# Patient Record
Sex: MALE | Race: OTHER | NOT HISPANIC OR LATINO | URBAN - METROPOLITAN AREA
[De-identification: names, ages, dates, MRNs, and addresses within clinical notes are randomized per-mention and may not be internally consistent; named-entity substitution may affect disease eponyms.]

---

## 2017-10-01 ENCOUNTER — OUTPATIENT (OUTPATIENT)
Dept: OUTPATIENT SERVICES | Facility: HOSPITAL | Age: 32
LOS: 1 days | End: 2017-10-01
Payer: MEDICAID

## 2017-10-01 PROCEDURE — G9001: CPT

## 2017-10-12 DIAGNOSIS — R69 ILLNESS, UNSPECIFIED: ICD-10-CM

## 2022-02-26 ENCOUNTER — INPATIENT (INPATIENT)
Facility: HOSPITAL | Age: 37
LOS: 1 days | Discharge: HOME | DRG: 897 | End: 2022-02-28
Attending: INTERNAL MEDICINE | Admitting: INTERNAL MEDICINE
Payer: MEDICAID

## 2022-02-26 VITALS
OXYGEN SATURATION: 98 % | RESPIRATION RATE: 17 BRPM | HEART RATE: 76 BPM | HEIGHT: 72 IN | DIASTOLIC BLOOD PRESSURE: 80 MMHG | WEIGHT: 266.98 LBS | SYSTOLIC BLOOD PRESSURE: 137 MMHG | TEMPERATURE: 98 F

## 2022-02-26 DIAGNOSIS — F11.23 OPIOID DEPENDENCE WITH WITHDRAWAL: ICD-10-CM

## 2022-02-26 DIAGNOSIS — J45.909 UNSPECIFIED ASTHMA, UNCOMPLICATED: ICD-10-CM

## 2022-02-26 DIAGNOSIS — Z29.9 ENCOUNTER FOR PROPHYLACTIC MEASURES, UNSPECIFIED: ICD-10-CM

## 2022-02-26 LAB
ALBUMIN SERPL ELPH-MCNC: 3.7 G/DL — SIGNIFICANT CHANGE UP (ref 3.5–5)
ALP SERPL-CCNC: 119 U/L — SIGNIFICANT CHANGE UP (ref 40–120)
ALT FLD-CCNC: 35 U/L DA — SIGNIFICANT CHANGE UP (ref 10–60)
AMPHET UR-MCNC: NEGATIVE — SIGNIFICANT CHANGE UP
ANION GAP SERPL CALC-SCNC: 7 MMOL/L — SIGNIFICANT CHANGE UP (ref 5–17)
APPEARANCE UR: CLEAR — SIGNIFICANT CHANGE UP
AST SERPL-CCNC: 19 U/L — SIGNIFICANT CHANGE UP (ref 10–40)
BACTERIA # UR AUTO: ABNORMAL /HPF
BARBITURATES UR SCN-MCNC: NEGATIVE — SIGNIFICANT CHANGE UP
BASOPHILS # BLD AUTO: 0.03 K/UL — SIGNIFICANT CHANGE UP (ref 0–0.2)
BASOPHILS NFR BLD AUTO: 0.3 % — SIGNIFICANT CHANGE UP (ref 0–2)
BENZODIAZ UR-MCNC: NEGATIVE — SIGNIFICANT CHANGE UP
BILIRUB SERPL-MCNC: 0.8 MG/DL — SIGNIFICANT CHANGE UP (ref 0.2–1.2)
BILIRUB UR-MCNC: NEGATIVE — SIGNIFICANT CHANGE UP
BUN SERPL-MCNC: 10 MG/DL — SIGNIFICANT CHANGE UP (ref 7–18)
CALCIUM SERPL-MCNC: 8.9 MG/DL — SIGNIFICANT CHANGE UP (ref 8.4–10.5)
CHLORIDE SERPL-SCNC: 109 MMOL/L — HIGH (ref 96–108)
CO2 SERPL-SCNC: 22 MMOL/L — SIGNIFICANT CHANGE UP (ref 22–31)
COCAINE METAB.OTHER UR-MCNC: NEGATIVE — SIGNIFICANT CHANGE UP
COLOR SPEC: YELLOW — SIGNIFICANT CHANGE UP
CREAT SERPL-MCNC: 0.9 MG/DL — SIGNIFICANT CHANGE UP (ref 0.5–1.3)
DIFF PNL FLD: ABNORMAL
EOSINOPHIL # BLD AUTO: 0.03 K/UL — SIGNIFICANT CHANGE UP (ref 0–0.5)
EOSINOPHIL NFR BLD AUTO: 0.3 % — SIGNIFICANT CHANGE UP (ref 0–6)
EPI CELLS # UR: ABNORMAL /HPF
GLUCOSE SERPL-MCNC: 108 MG/DL — HIGH (ref 70–99)
GLUCOSE UR QL: NEGATIVE — SIGNIFICANT CHANGE UP
HCT VFR BLD CALC: 39.4 % — SIGNIFICANT CHANGE UP (ref 39–50)
HGB BLD-MCNC: 13.2 G/DL — SIGNIFICANT CHANGE UP (ref 13–17)
IMM GRANULOCYTES NFR BLD AUTO: 0.4 % — SIGNIFICANT CHANGE UP (ref 0–1.5)
KETONES UR-MCNC: NEGATIVE — SIGNIFICANT CHANGE UP
LEUKOCYTE ESTERASE UR-ACNC: NEGATIVE — SIGNIFICANT CHANGE UP
LYMPHOCYTES # BLD AUTO: 1.76 K/UL — SIGNIFICANT CHANGE UP (ref 1–3.3)
LYMPHOCYTES # BLD AUTO: 15.8 % — SIGNIFICANT CHANGE UP (ref 13–44)
MCHC RBC-ENTMCNC: 28.2 PG — SIGNIFICANT CHANGE UP (ref 27–34)
MCHC RBC-ENTMCNC: 33.5 GM/DL — SIGNIFICANT CHANGE UP (ref 32–36)
MCV RBC AUTO: 84.2 FL — SIGNIFICANT CHANGE UP (ref 80–100)
METHADONE UR-MCNC: POSITIVE
MONOCYTES # BLD AUTO: 0.63 K/UL — SIGNIFICANT CHANGE UP (ref 0–0.9)
MONOCYTES NFR BLD AUTO: 5.6 % — SIGNIFICANT CHANGE UP (ref 2–14)
NEUTROPHILS # BLD AUTO: 8.66 K/UL — HIGH (ref 1.8–7.4)
NEUTROPHILS NFR BLD AUTO: 77.6 % — HIGH (ref 43–77)
NITRITE UR-MCNC: NEGATIVE — SIGNIFICANT CHANGE UP
NRBC # BLD: 0 /100 WBCS — SIGNIFICANT CHANGE UP (ref 0–0)
OPIATES UR-MCNC: NEGATIVE — SIGNIFICANT CHANGE UP
PCP SPEC-MCNC: SIGNIFICANT CHANGE UP
PCP UR-MCNC: NEGATIVE — SIGNIFICANT CHANGE UP
PH UR: 6 — SIGNIFICANT CHANGE UP (ref 5–8)
PLATELET # BLD AUTO: 257 K/UL — SIGNIFICANT CHANGE UP (ref 150–400)
POTASSIUM SERPL-MCNC: 3.6 MMOL/L — SIGNIFICANT CHANGE UP (ref 3.5–5.3)
POTASSIUM SERPL-SCNC: 3.6 MMOL/L — SIGNIFICANT CHANGE UP (ref 3.5–5.3)
PROT SERPL-MCNC: 7.9 G/DL — SIGNIFICANT CHANGE UP (ref 6–8.3)
PROT UR-MCNC: NEGATIVE — SIGNIFICANT CHANGE UP
RBC # BLD: 4.68 M/UL — SIGNIFICANT CHANGE UP (ref 4.2–5.8)
RBC # FLD: 12.2 % — SIGNIFICANT CHANGE UP (ref 10.3–14.5)
RBC CASTS # UR COMP ASSIST: SIGNIFICANT CHANGE UP /HPF (ref 0–2)
SARS-COV-2 RNA SPEC QL NAA+PROBE: SIGNIFICANT CHANGE UP
SODIUM SERPL-SCNC: 138 MMOL/L — SIGNIFICANT CHANGE UP (ref 135–145)
SP GR SPEC: 1.02 — SIGNIFICANT CHANGE UP (ref 1.01–1.02)
THC UR QL: NEGATIVE — SIGNIFICANT CHANGE UP
UROBILINOGEN FLD QL: NEGATIVE — SIGNIFICANT CHANGE UP
WBC # BLD: 11.16 K/UL — HIGH (ref 3.8–10.5)
WBC # FLD AUTO: 11.16 K/UL — HIGH (ref 3.8–10.5)
WBC UR QL: SIGNIFICANT CHANGE UP /HPF (ref 0–5)

## 2022-02-26 PROCEDURE — 99285 EMERGENCY DEPT VISIT HI MDM: CPT

## 2022-02-26 RX ORDER — PANTOPRAZOLE SODIUM 20 MG/1
40 TABLET, DELAYED RELEASE ORAL
Refills: 0 | Status: DISCONTINUED | OUTPATIENT
Start: 2022-02-26 | End: 2022-02-28

## 2022-02-26 RX ORDER — SODIUM CHLORIDE 9 MG/ML
1000 INJECTION INTRAMUSCULAR; INTRAVENOUS; SUBCUTANEOUS
Refills: 0 | Status: DISCONTINUED | OUTPATIENT
Start: 2022-02-26 | End: 2022-02-28

## 2022-02-26 RX ORDER — METHADONE HYDROCHLORIDE 40 MG/1
10 TABLET ORAL ONCE
Refills: 0 | Status: DISCONTINUED | OUTPATIENT
Start: 2022-02-26 | End: 2022-02-26

## 2022-02-26 RX ORDER — NICOTINE POLACRILEX 2 MG
1 GUM BUCCAL DAILY
Refills: 0 | Status: DISCONTINUED | OUTPATIENT
Start: 2022-02-26 | End: 2022-02-27

## 2022-02-26 RX ORDER — SODIUM CHLORIDE 9 MG/ML
1000 INJECTION INTRAMUSCULAR; INTRAVENOUS; SUBCUTANEOUS ONCE
Refills: 0 | Status: COMPLETED | OUTPATIENT
Start: 2022-02-26 | End: 2022-02-26

## 2022-02-26 RX ORDER — KETOROLAC TROMETHAMINE 30 MG/ML
15 SYRINGE (ML) INJECTION ONCE
Refills: 0 | Status: DISCONTINUED | OUTPATIENT
Start: 2022-02-26 | End: 2022-02-26

## 2022-02-26 RX ORDER — OCTREOTIDE ACETATE 200 UG/ML
50 INJECTION, SOLUTION INTRAVENOUS; SUBCUTANEOUS ONCE
Refills: 0 | Status: COMPLETED | OUTPATIENT
Start: 2022-02-26 | End: 2022-02-26

## 2022-02-26 RX ORDER — METHADONE HYDROCHLORIDE 40 MG/1
20 TABLET ORAL ONCE
Refills: 0 | Status: DISCONTINUED | OUTPATIENT
Start: 2022-02-26 | End: 2022-02-26

## 2022-02-26 RX ORDER — METOCLOPRAMIDE HCL 10 MG
10 TABLET ORAL EVERY 8 HOURS
Refills: 0 | Status: DISCONTINUED | OUTPATIENT
Start: 2022-02-26 | End: 2022-02-26

## 2022-02-26 RX ORDER — LOPERAMIDE HCL 2 MG
2 TABLET ORAL THREE TIMES A DAY
Refills: 0 | Status: DISCONTINUED | OUTPATIENT
Start: 2022-02-26 | End: 2022-02-26

## 2022-02-26 RX ADMIN — SODIUM CHLORIDE 150 MILLILITER(S): 9 INJECTION INTRAMUSCULAR; INTRAVENOUS; SUBCUTANEOUS at 20:55

## 2022-02-26 RX ADMIN — Medication 1 PATCH: at 21:12

## 2022-02-26 RX ADMIN — METHADONE HYDROCHLORIDE 10 MILLIGRAM(S): 40 TABLET ORAL at 20:55

## 2022-02-26 RX ADMIN — Medication 2 MILLIGRAM(S): at 14:08

## 2022-02-26 RX ADMIN — SODIUM CHLORIDE 1000 MILLILITER(S): 9 INJECTION INTRAMUSCULAR; INTRAVENOUS; SUBCUTANEOUS at 16:53

## 2022-02-26 RX ADMIN — SODIUM CHLORIDE 2000 MILLILITER(S): 9 INJECTION INTRAMUSCULAR; INTRAVENOUS; SUBCUTANEOUS at 14:08

## 2022-02-26 RX ADMIN — Medication 15 MILLIGRAM(S): at 14:08

## 2022-02-26 RX ADMIN — OCTREOTIDE ACETATE 50 MICROGRAM(S): 200 INJECTION, SOLUTION INTRAVENOUS; SUBCUTANEOUS at 22:12

## 2022-02-26 RX ADMIN — SODIUM CHLORIDE 1000 MILLILITER(S): 9 INJECTION INTRAMUSCULAR; INTRAVENOUS; SUBCUTANEOUS at 18:09

## 2022-02-26 RX ADMIN — Medication 2 MILLIGRAM(S): at 15:23

## 2022-02-26 RX ADMIN — Medication 15 MILLIGRAM(S): at 15:14

## 2022-02-26 NOTE — CONSULT NOTE ADULT - SUBJECTIVE AND OBJECTIVE BOX
Patient is a 36y old  Male who presents with a chief complaint of     HPI:      Allergies    No Known Allergies    Intolerances        MEDICATIONS  (STANDING):  methadone Injectable 10 milliGRAM(s) IntraMuscular once  octreotide  Injectable 50 MICROGram(s) SubCutaneous once  sodium chloride 0.9%. 1000 milliLiter(s) (150 mL/Hr) IV Continuous <Continuous>    MEDICATIONS  (PRN):      Daily Height in cm: 182.88 (26 Feb 2022 13:11)    Daily     Drug Dosing Weight  Height (cm): 182.9 (26 Feb 2022 13:11)  Weight (kg): 121.1 (26 Feb 2022 13:11)  BMI (kg/m2): 36.2 (26 Feb 2022 13:11)  BSA (m2): 2.41 (26 Feb 2022 13:11)    PAST MEDICAL & SURGICAL HISTORY:      FAMILY HISTORY:      SOCIAL HISTORY:    ADVANCE DIRECTIVES:    REVIEW OF SYSTEMS:    CONSTITUTIONAL: No fever, weight loss, or fatigue  EYES: No eye pain, visual disturbances, or discharge  ENMT:  No difficulty hearing, tinnitus, vertigo; No sinus or throat pain  NECK: No pain or stiffness  BREASTS: No pain, masses, or nipple discharge  RESPIRATORY: No cough, wheezing, chills or hemoptysis; No shortness of breath  CARDIOVASCULAR: No chest pain, palpitations, dizziness, or leg swelling  GASTROINTESTINAL: No abdominal or epigastric pain. No nausea, vomiting, or hematemesis; No diarrhea or constipation. No melena or hematochezia.  GENITOURINARY: No dysuria, frequency, hematuria, or incontinence  NEUROLOGICAL: No headaches, memory loss, loss of strength, numbness, or tremors  SKIN: No itching, burning, rashes, or lesions   LYMPH NODES: No enlarged glands  ENDOCRINE: No heat or cold intolerance; No hair loss  MUSCULOSKELETAL: No joint pain or swelling; No muscle, back, or extremity pain  PSYCHIATRIC: No depression, anxiety, mood swings, or difficulty sleeping  HEME/LYMPH: No easy bruising, or bleeding gums  ALLERGY AND IMMUNOLOGIC: No hives or eczema          ICU Vital Signs Last 24 Hrs  T(C): 36.8 (26 Feb 2022 20:12), Max: 37.2 (26 Feb 2022 15:27)  T(F): 98.2 (26 Feb 2022 20:12), Max: 98.9 (26 Feb 2022 15:27)  HR: 81 (26 Feb 2022 20:12) (76 - 81)  BP: 130/72 (26 Feb 2022 20:12) (130/72 - 141/71)  BP(mean): --  ABP: --  ABP(mean): --  RR: 18 (26 Feb 2022 20:12) (17 - 18)  SpO2: 95% (26 Feb 2022 20:12) (95% - 98%)          I&O's Detail      PHYSICAL EXAM:    GENERAL: NAD, well-groomed, well-developed  HEAD:  Atraumatic, Normocephalic  EYES: EOMI, PERRLA, conjunctiva and sclera clear  ENMT: No tonsillar erythema, exudates, or enlargement; Moist mucous membranes, Good dentition, No lesions  NECK: Supple, No JVD, Normal thyroid  NERVOUS SYSTEM:  Alert & Oriented X3, Good concentration; Motor Strength 5/5 B/L upper and lower extremities; DTRs 2+ intact and symmetric  CHEST/LUNG: Clear to percussion bilaterally; No rales, rhonchi, wheezing, or rubs  HEART: Regular rate and rhythm; No murmurs, rubs, or gallops  ABDOMEN: Soft, Nontender, Nondistended; Bowel sounds present  EXTREMITIES:  2+ Peripheral Pulses, No clubbing, cyanosis, or edema  LYMPH: No lymphadenopathy noted  SKIN: No rashes or lesions    LABS:  CBC Full  -  ( 26 Feb 2022 14:16 )  WBC Count : 11.16 K/uL  RBC Count : 4.68 M/uL  Hemoglobin : 13.2 g/dL  Hematocrit : 39.4 %  Platelet Count - Automated : 257 K/uL  Mean Cell Volume : 84.2 fl  Mean Cell Hemoglobin : 28.2 pg  Mean Cell Hemoglobin Concentration : 33.5 gm/dL  Auto Neutrophil # : 8.66 K/uL  Auto Lymphocyte # : 1.76 K/uL  Auto Monocyte # : 0.63 K/uL  Auto Eosinophil # : 0.03 K/uL  Auto Basophil # : 0.03 K/uL  Auto Neutrophil % : 77.6 %  Auto Lymphocyte % : 15.8 %  Auto Monocyte % : 5.6 %  Auto Eosinophil % : 0.3 %  Auto Basophil % : 0.3 %    02-26    138  |  109<H>  |  10  ----------------------------<  108<H>  3.6   |  22  |  0.90    Ca    8.9      26 Feb 2022 14:16    TPro  7.9  /  Alb  3.7  /  TBili  0.8  /  DBili  x   /  AST  19  /  ALT  35  /  AlkPhos  119  02-26    CAPILLARY BLOOD GLUCOSE                    EKG:    ECHO, US:    RADIOLOGY:    CRITICAL CARE TIME SPENT:   Patient is a 36y old  Male who presents with a chief complaint of     HPI:  35 yo M from home lives with brother , with history of polysubstance abuse, active smoker presented with withdrawal symptoms. Historey of substance abuse, in terms of subctance , duration , intervals in unknown , pt endorsed started sniffing Fentanyl 2mg 5-6 times a day, takes Ketamine every once in a while, last Fentanyl use was 3pm on 2/25/2022, and last ketamine use around 2 weeks ago. Patient decided to quit substance abuse and his withdrawal sx , including , sweating , abdominal pain , diarrhea multiple times , vomiting twice, restless ness started since 1am last night, frequent episodes of diarrhea and abdominal pain prompt him to present to ED.      In ED patient found to have stable vitals  and normal ECG . Patient recieved Ativan 2mg X2  for anxiety , 2 L NS , Octreotide sq, abd Methadone 10mg IM.       Allergies    No Known Allergies    Intolerances        MEDICATIONS  (STANDING):  methadone Injectable 10 milliGRAM(s) IntraMuscular once  octreotide  Injectable 50 MICROGram(s) SubCutaneous once  sodium chloride 0.9%. 1000 milliLiter(s) (150 mL/Hr) IV Continuous <Continuous>    MEDICATIONS  (PRN):      Daily Height in cm: 182.88 (26 Feb 2022 13:11)    Daily     Drug Dosing Weight  Height (cm): 182.9 (26 Feb 2022 13:11)  Weight (kg): 121.1 (26 Feb 2022 13:11)  BMI (kg/m2): 36.2 (26 Feb 2022 13:11)  BSA (m2): 2.41 (26 Feb 2022 13:11)    PAST MEDICAL & SURGICAL HISTORY:      FAMILY HISTORY:      SOCIAL HISTORY:    ADVANCE DIRECTIVES:    REVIEW OF SYSTEMS:    CONSTITUTIONAL: No fever, weight loss, or fatigue  EYES: No eye pain, visual disturbances, or discharge  ENMT:  No difficulty hearing, tinnitus, vertigo; No sinus or throat pain  NECK: No pain or stiffness  BREASTS: No pain, masses, or nipple discharge  RESPIRATORY: No cough, wheezing, chills or hemoptysis; No shortness of breath  CARDIOVASCULAR: No chest pain, palpitations, dizziness, or leg swelling  GASTROINTESTINAL: No abdominal or epigastric pain. No nausea, vomiting, or hematemesis; No diarrhea or constipation. No melena or hematochezia.  GENITOURINARY: No dysuria, frequency, hematuria, or incontinence  NEUROLOGICAL: No headaches, memory loss, loss of strength, numbness, or tremors  SKIN: No itching, burning, rashes, or lesions   LYMPH NODES: No enlarged glands  ENDOCRINE: No heat or cold intolerance; No hair loss  MUSCULOSKELETAL: No joint pain or swelling; No muscle, back, or extremity pain  PSYCHIATRIC: No depression, anxiety, mood swings, or difficulty sleeping  HEME/LYMPH: No easy bruising, or bleeding gums  ALLERGY AND IMMUNOLOGIC: No hives or eczema          ICU Vital Signs Last 24 Hrs  T(C): 36.8 (26 Feb 2022 20:12), Max: 37.2 (26 Feb 2022 15:27)  T(F): 98.2 (26 Feb 2022 20:12), Max: 98.9 (26 Feb 2022 15:27)  HR: 81 (26 Feb 2022 20:12) (76 - 81)  BP: 130/72 (26 Feb 2022 20:12) (130/72 - 141/71)  BP(mean): --  ABP: --  ABP(mean): --  RR: 18 (26 Feb 2022 20:12) (17 - 18)  SpO2: 95% (26 Feb 2022 20:12) (95% - 98%)          I&O's Detail      PHYSICAL EXAM:    GENERAL: NAD, well-groomed, well-developed  HEAD:  Atraumatic, Normocephalic  EYES: EOMI, PERRLA, conjunctiva and sclera clear  ENMT: No tonsillar erythema, exudates, or enlargement; Moist mucous membranes, Good dentition, No lesions  NECK: Supple, No JVD, Normal thyroid  NERVOUS SYSTEM:  Alert & Oriented X3, Good concentration; Motor Strength 5/5 B/L upper and lower extremities; DTRs 2+ intact and symmetric  CHEST/LUNG: Clear to percussion bilaterally; No rales, rhonchi, wheezing, or rubs  HEART: Regular rate and rhythm; No murmurs, rubs, or gallops  ABDOMEN: Soft, Nontender, Nondistended; Bowel sounds present  EXTREMITIES:  2+ Peripheral Pulses, No clubbing, cyanosis, or edema  LYMPH: No lymphadenopathy noted  SKIN: No rashes or lesions    LABS:  CBC Full  -  ( 26 Feb 2022 14:16 )  WBC Count : 11.16 K/uL  RBC Count : 4.68 M/uL  Hemoglobin : 13.2 g/dL  Hematocrit : 39.4 %  Platelet Count - Automated : 257 K/uL  Mean Cell Volume : 84.2 fl  Mean Cell Hemoglobin : 28.2 pg  Mean Cell Hemoglobin Concentration : 33.5 gm/dL  Auto Neutrophil # : 8.66 K/uL  Auto Lymphocyte # : 1.76 K/uL  Auto Monocyte # : 0.63 K/uL  Auto Eosinophil # : 0.03 K/uL  Auto Basophil # : 0.03 K/uL  Auto Neutrophil % : 77.6 %  Auto Lymphocyte % : 15.8 %  Auto Monocyte % : 5.6 %  Auto Eosinophil % : 0.3 %  Auto Basophil % : 0.3 %    02-26    138  |  109<H>  |  10  ----------------------------<  108<H>  3.6   |  22  |  0.90    Ca    8.9      26 Feb 2022 14:16    TPro  7.9  /  Alb  3.7  /  TBili  0.8  /  DBili  x   /  AST  19  /  ALT  35  /  AlkPhos  119  02-26    CAPILLARY BLOOD GLUCOSE                    EKG:    ECHO, US:    RADIOLOGY:    CRITICAL CARE TIME SPENT:     HPI:  35 yo M from home lives with brother , with history of polysubstance abuse, active smoker presented with withdrawal symptoms. History of substance abuse, in terms of substance , duration , intervals in unknown , pt endorsed started sniffing Fentanyl 2mg 5-6 times a day, takes Ketamine every once in a while, last Fentanyl use was 3pm on 2/25/2022, and last ketamine use around 2 weeks ago. Patient decided to quit substance abuse and his withdrawal sx , including , sweating , abdominal pain , diarrhea multiple times , vomiting twice, restless ness started since 1am last night, frequent episodes of diarrhea and abdominal pain prompt him to present to ED.      In ED patient found to have stable vitals BP:122/72,  pulse-ox: 98%, HR: 79, t: 98.5 and normal ECG . Patient recieved Ativan 2mg X2  for anxiety , 2 L NS , Octreotide sq, abd Methadone 10mg IM.        Denies ETOH   Allergies    No Known Allergies    Intolerances        MEDICATIONS  (STANDING):  methadone Injectable 10 milliGRAM(s) IntraMuscular once  octreotide  Injectable 50 MICROGram(s) SubCutaneous once  sodium chloride 0.9%. 1000 milliLiter(s) (150 mL/Hr) IV Continuous <Continuous>    MEDICATIONS  (PRN):      Daily Height in cm: 182.88 (26 Feb 2022 13:11)    Daily     Drug Dosing Weight  Height (cm): 182.9 (26 Feb 2022 13:11)  Weight (kg): 121.1 (26 Feb 2022 13:11)  BMI (kg/m2): 36.2 (26 Feb 2022 13:11)  BSA (m2): 2.41 (26 Feb 2022 13:11)    PAST MEDICAL & SURGICAL HISTORY:      FAMILY HISTORY:      SOCIAL HISTORY:    ADVANCE DIRECTIVES:          ICU Vital Signs Last 24 Hrs  T(C): 36.8 (26 Feb 2022 20:12), Max: 37.2 (26 Feb 2022 15:27)  T(F): 98.2 (26 Feb 2022 20:12), Max: 98.9 (26 Feb 2022 15:27)  HR: 81 (26 Feb 2022 20:12) (76 - 81)  BP: 130/72 (26 Feb 2022 20:12) (130/72 - 141/71)  BP(mean): --  ABP: --  ABP(mean): --  RR: 18 (26 Feb 2022 20:12) (17 - 18)  SpO2: 95% (26 Feb 2022 20:12) (95% - 98%)          I&O's Detail      PHYSICAL EXAM:    GENERAL:  In  moderate distress due to general pain   HEAD:  Atraumatic, Normocephalic  EYES: EOMI, PERRLA, conjunctiva and sclera clear  ENMT: No tonsillar erythema, exudates, or enlargement; Moist mucous membranes, Good dentition, No lesions  NECK: Supple, No JVD, Normal thyroid  NERVOUS SYSTEM:  Alert & Oriented X3,  CHEST/LUNG: Clear to percussion bilaterally; No rales, rhonchi, wheezing, or rubs  HEART: Regular rate and rhythm;  ABDOMEN: Soft, diffuse tenderness  EXTREMITIES:  2+ Peripheral Pulses, No clubbing, cyanosis, or edema  LYMPH: No lymphadenopathy noted  SKIN: No rashes or lesions    LABS:  CBC Full  -  ( 26 Feb 2022 14:16 )  WBC Count : 11.16 K/uL  RBC Count : 4.68 M/uL  Hemoglobin : 13.2 g/dL  Hematocrit : 39.4 %  Platelet Count - Automated : 257 K/uL  Mean Cell Volume : 84.2 fl  Mean Cell Hemoglobin : 28.2 pg  Mean Cell Hemoglobin Concentration : 33.5 gm/dL  Auto Neutrophil # : 8.66 K/uL  Auto Lymphocyte # : 1.76 K/uL  Auto Monocyte # : 0.63 K/uL  Auto Eosinophil # : 0.03 K/uL  Auto Basophil # : 0.03 K/uL  Auto Neutrophil % : 77.6 %  Auto Lymphocyte % : 15.8 %  Auto Monocyte % : 5.6 %  Auto Eosinophil % : 0.3 %  Auto Basophil % : 0.3 %    02-26    138  |  109<H>  |  10  ----------------------------<  108<H>  3.6   |  22  |  0.90    Ca    8.9      26 Feb 2022 14:16    TPro  7.9  /  Alb  3.7  /  TBili  0.8  /  DBili  x   /  AST  19  /  ALT  35  /  AlkPhos  119  02-26    CAPILLARY BLOOD GLUCOSE                    EKG:    ECHO, US:    RADIOLOGY:    CRITICAL CARE TIME SPENT:

## 2022-02-26 NOTE — CONSULT NOTE ADULT - ASSESSMENT
37 yo M from home lives with brother , with history of polysubstance abuse, active smoker  (1PPD) presented with withdrawal symptoms. History of substance abuse, in terms of substance , duration , intervals in unknown , pt endorsed started sniffing Fentanyl 2mg 5-6 times a day, takes Ketamine every once in a while, last Fentanyl use was 3pm on 2/25/2022, and last ketamine use around 2 weeks ago. Patient decided to quit substance abuse and his withdrawal sx , including , sweating , abdominal pain , diarrhea multiple times , vomiting twice, restless ness started since 1am last night, frequent episodes of diarrhea and abdominal pain prompt him to present to ED. ICU consulted to assess patient.         Assessment and Plan:   Opioid withdrawal   - Presented with moderate opioid withdrawal , GI distress, tremor yawning , myalgia  and diarrhea  rhinorrhea , diaphoresis , anxiety and irritability   - treatment started with methadone 10mg IM   - patient s/p Octreotide sq  - s/p 2 L NS in ED   - IV ativan 2 mg X2 was given   - c/w Methadone 10 mg IV / 20mg po when patient tolerates po   - Promethazine for N/V  - can start on Loperamide for diarrhea   - c/w IVF hydration  - Nicotine patch    - Repeat labs including all electrolytes and replace as needed   - Monitor vitals and mental status  - Monitor bowel movements frequency  - Urine toxicology and alcohol level   - Involve SW   - Patient does not require ICU care at this time, reconsult as needed

## 2022-02-26 NOTE — H&P ADULT - PROBLEM SELECTOR PLAN 1
Presents with Diarrhea, Nausea, vomiting, diaphoresis, anxiety  COWS: 16, moderate withdrawal   S/p Methadone 10mg IM, ativan 4mg  Octreotide for diarrhea   s/p 2L NS, cont IV fluids @150cc/hr  Follow Urine tox and UA  SW/CM consult Presents with Diarrhea, Nausea, vomiting, diaphoresis, anxiety  Hemodynamically stable   COWS: 16, moderate withdrawal   S/p Methadone 10mg IM, ativan 4mg  Octreotide for diarrhea   s/p 2L NS, cont IV fluids @150cc/hr  Follow Urine tox and UA  SW/CM consult Presents with Diarrhea, Nausea, vomiting, diaphoresis, anxiety  Hemodynamically stable   COWS: 16, moderate withdrawal   S/p Methadone 10mg IM, ativan 4mg  s/p Octreotide for diarrhea   s/p 2L NS, cont IV fluids @150cc/hr  Start on loperamide for diarrhea   Give Methadone 20mg PO tomorrow if continues to withdraw   Follow Urine tox and UA  SW/CM consult  Repeat labs including all electrolytes and replace as needed   Monitor vitals and mental status

## 2022-02-26 NOTE — H&P ADULT - ASSESSMENT
35 yo M from home lives with brother , with history of polysubstance abuse, active smoker presented with withdrawal symptoms. Admitted for opioid withdrawal

## 2022-02-26 NOTE — ED ADULT NURSE NOTE - OBJECTIVE STATEMENT
Patient presents with c/o generalized body pain, stated " I stopped  taking fentanyl yesterday", noted mildly anxious, cooperative denies palpitation, respiratory distress.

## 2022-02-26 NOTE — ED PROVIDER NOTE - CONSTITUTIONAL, MLM
normal... uncomfortable/anxious, awake, alert, oriented to person, place, time/situation and in no apparent distress.

## 2022-02-26 NOTE — ED PROVIDER NOTE - OBJECTIVE STATEMENT
36-year-old male hx of fentanyl abuse, presenting with fentanyl withdrawal. Last used yesterday. Wants to quit. Having body aches, abdominal pain, nausea, vomiting, tremors, anxiety. No chest pain, SOB, or any other concerning symptoms.

## 2022-02-26 NOTE — H&P ADULT - NSHPPHYSICALEXAM_GEN_ALL_CORE
LOS:     VITALS:   T(C): 36.8 (02-26-22 @ 20:12), Max: 37.2 (02-26-22 @ 15:27)  HR: 81 (02-26-22 @ 20:12) (76 - 81)  BP: 130/72 (02-26-22 @ 20:12) (130/72 - 141/71)  RR: 18 (02-26-22 @ 20:12) (17 - 18)  SpO2: 95% (02-26-22 @ 20:12) (95% - 98%)    GENERAL: agitated and diaphoretic   HEAD:  Atraumatic, Normocephalic  EYES: EOMI, PERRLA, conjunctiva and sclera clear  ENT: Moist mucous membranes  NECK: Supple, No JVD  CHEST/LUNG: Clear to auscultation bilaterally; No rales, rhonchi, wheezing, or rubs. Unlabored respirations  HEART: Regular rate and rhythm; No murmurs, rubs, or gallops  ABDOMEN: BSx4; Soft, nontender, nondistended  EXTREMITIES:  2+ Peripheral Pulses, brisk capillary refill. No clubbing, cyanosis, or edema  NERVOUS SYSTEM:  A&Ox3, no focal deficits   SKIN: No rashes or lesions

## 2022-02-26 NOTE — H&P ADULT - HISTORY OF PRESENT ILLNESS
35 yo M from home lives with brother , with history of polysubstance abuse, active smoker presented with withdrawal symptoms. History of substance abuse, in terms of substance , duration , intervals in unknown , pt endorsed started sniffing Fentanyl 2mg 5-6 times a day, takes Ketamine every once in a while, last Fentanyl use was 3pm on 2/25/2022, and last ketamine use around 2 weeks ago. Patient decided to quit substance abuse and his withdrawal sx , including , sweating , abdominal pain , diarrhea multiple times , vomiting twice, restless ness started since 1am last night, frequent episodes of diarrhea and abdominal pain prompt him to present to ED.      In ED patient found to have stable vitals  and normal ECG . Patient recieved Ativan 2mg X2  for anxiety , 2 L NS , Octreotide sq, abd Methadone 10mg IM.

## 2022-02-26 NOTE — ED PROVIDER NOTE - CLINICAL SUMMARY MEDICAL DECISION MAKING FREE TEXT BOX
36-year-old male hx of fentanyl abuse, presenting with fentanyl withdrawal. Labs ok. Patient still unwell, requesting admission, actively withdrawing and uncomfortable. WIll adimt to Medicine.

## 2022-02-27 LAB
ALBUMIN SERPL ELPH-MCNC: 3.6 G/DL — SIGNIFICANT CHANGE UP (ref 3.5–5)
ALP SERPL-CCNC: 101 U/L — SIGNIFICANT CHANGE UP (ref 40–120)
ALT FLD-CCNC: 32 U/L DA — SIGNIFICANT CHANGE UP (ref 10–60)
ANION GAP SERPL CALC-SCNC: 8 MMOL/L — SIGNIFICANT CHANGE UP (ref 5–17)
AST SERPL-CCNC: 14 U/L — SIGNIFICANT CHANGE UP (ref 10–40)
BILIRUB SERPL-MCNC: 0.6 MG/DL — SIGNIFICANT CHANGE UP (ref 0.2–1.2)
BUN SERPL-MCNC: 10 MG/DL — SIGNIFICANT CHANGE UP (ref 7–18)
CALCIUM SERPL-MCNC: 8.5 MG/DL — SIGNIFICANT CHANGE UP (ref 8.4–10.5)
CHLORIDE SERPL-SCNC: 111 MMOL/L — HIGH (ref 96–108)
CO2 SERPL-SCNC: 21 MMOL/L — LOW (ref 22–31)
CREAT SERPL-MCNC: 0.7 MG/DL — SIGNIFICANT CHANGE UP (ref 0.5–1.3)
GLUCOSE SERPL-MCNC: 98 MG/DL — SIGNIFICANT CHANGE UP (ref 70–99)
HCT VFR BLD CALC: 35.4 % — LOW (ref 39–50)
HGB BLD-MCNC: 12.3 G/DL — LOW (ref 13–17)
MAGNESIUM SERPL-MCNC: 2.1 MG/DL — SIGNIFICANT CHANGE UP (ref 1.6–2.6)
MCHC RBC-ENTMCNC: 29.1 PG — SIGNIFICANT CHANGE UP (ref 27–34)
MCHC RBC-ENTMCNC: 34.7 GM/DL — SIGNIFICANT CHANGE UP (ref 32–36)
MCV RBC AUTO: 83.9 FL — SIGNIFICANT CHANGE UP (ref 80–100)
NRBC # BLD: 0 /100 WBCS — SIGNIFICANT CHANGE UP (ref 0–0)
PHOSPHATE SERPL-MCNC: 3.5 MG/DL — SIGNIFICANT CHANGE UP (ref 2.5–4.5)
PLATELET # BLD AUTO: 224 K/UL — SIGNIFICANT CHANGE UP (ref 150–400)
POTASSIUM SERPL-MCNC: 3.4 MMOL/L — LOW (ref 3.5–5.3)
POTASSIUM SERPL-SCNC: 3.4 MMOL/L — LOW (ref 3.5–5.3)
PROT SERPL-MCNC: 7.1 G/DL — SIGNIFICANT CHANGE UP (ref 6–8.3)
RBC # BLD: 4.22 M/UL — SIGNIFICANT CHANGE UP (ref 4.2–5.8)
RBC # FLD: 12.5 % — SIGNIFICANT CHANGE UP (ref 10.3–14.5)
SODIUM SERPL-SCNC: 140 MMOL/L — SIGNIFICANT CHANGE UP (ref 135–145)
WBC # BLD: 9.32 K/UL — SIGNIFICANT CHANGE UP (ref 3.8–10.5)
WBC # FLD AUTO: 9.32 K/UL — SIGNIFICANT CHANGE UP (ref 3.8–10.5)

## 2022-02-27 RX ORDER — LANOLIN ALCOHOL/MO/W.PET/CERES
3 CREAM (GRAM) TOPICAL ONCE
Refills: 0 | Status: DISCONTINUED | OUTPATIENT
Start: 2022-02-27 | End: 2022-02-27

## 2022-02-27 RX ORDER — LOPERAMIDE HCL 2 MG
2 TABLET ORAL THREE TIMES A DAY
Refills: 0 | Status: DISCONTINUED | OUTPATIENT
Start: 2022-02-27 | End: 2022-02-28

## 2022-02-27 RX ORDER — METHADONE HYDROCHLORIDE 40 MG/1
20 TABLET ORAL ONCE
Refills: 0 | Status: DISCONTINUED | OUTPATIENT
Start: 2022-02-27 | End: 2022-02-27

## 2022-02-27 RX ORDER — LANOLIN ALCOHOL/MO/W.PET/CERES
3 CREAM (GRAM) TOPICAL AT BEDTIME
Refills: 0 | Status: DISCONTINUED | OUTPATIENT
Start: 2022-02-27 | End: 2022-02-27

## 2022-02-27 RX ORDER — NICOTINE POLACRILEX 2 MG
4 GUM BUCCAL DAILY
Refills: 0 | Status: DISCONTINUED | OUTPATIENT
Start: 2022-02-27 | End: 2022-02-28

## 2022-02-27 RX ORDER — ALBUTEROL 90 UG/1
2 AEROSOL, METERED ORAL EVERY 6 HOURS
Refills: 0 | Status: DISCONTINUED | OUTPATIENT
Start: 2022-02-27 | End: 2022-02-28

## 2022-02-27 RX ORDER — ZOLPIDEM TARTRATE 10 MG/1
5 TABLET ORAL AT BEDTIME
Refills: 0 | Status: DISCONTINUED | OUTPATIENT
Start: 2022-02-27 | End: 2022-02-28

## 2022-02-27 RX ORDER — ZOLPIDEM TARTRATE 10 MG/1
5 TABLET ORAL ONCE
Refills: 0 | Status: DISCONTINUED | OUTPATIENT
Start: 2022-02-27 | End: 2022-02-27

## 2022-02-27 RX ORDER — INFLUENZA VIRUS VACCINE 15; 15; 15; 15 UG/.5ML; UG/.5ML; UG/.5ML; UG/.5ML
0.5 SUSPENSION INTRAMUSCULAR ONCE
Refills: 0 | Status: DISCONTINUED | OUTPATIENT
Start: 2022-02-27 | End: 2022-02-28

## 2022-02-27 RX ADMIN — Medication 1 PATCH: at 19:00

## 2022-02-27 RX ADMIN — SODIUM CHLORIDE 150 MILLILITER(S): 9 INJECTION INTRAMUSCULAR; INTRAVENOUS; SUBCUTANEOUS at 18:44

## 2022-02-27 RX ADMIN — ZOLPIDEM TARTRATE 5 MILLIGRAM(S): 10 TABLET ORAL at 22:17

## 2022-02-27 RX ADMIN — Medication 4 MILLIGRAM(S): at 21:03

## 2022-02-27 RX ADMIN — Medication 1 MILLIGRAM(S): at 18:14

## 2022-02-27 RX ADMIN — PANTOPRAZOLE SODIUM 40 MILLIGRAM(S): 20 TABLET, DELAYED RELEASE ORAL at 05:53

## 2022-02-27 RX ADMIN — Medication 1 PATCH: at 08:08

## 2022-02-27 RX ADMIN — ZOLPIDEM TARTRATE 5 MILLIGRAM(S): 10 TABLET ORAL at 00:35

## 2022-02-27 RX ADMIN — SODIUM CHLORIDE 150 MILLILITER(S): 9 INJECTION INTRAMUSCULAR; INTRAVENOUS; SUBCUTANEOUS at 05:53

## 2022-02-27 RX ADMIN — Medication 1 PATCH: at 11:04

## 2022-02-27 RX ADMIN — Medication 1 MILLIGRAM(S): at 11:34

## 2022-02-27 RX ADMIN — SODIUM CHLORIDE 150 MILLILITER(S): 9 INJECTION INTRAMUSCULAR; INTRAVENOUS; SUBCUTANEOUS at 22:17

## 2022-02-27 RX ADMIN — Medication 1 PATCH: at 22:19

## 2022-02-27 RX ADMIN — Medication 2 MILLIGRAM(S): at 06:27

## 2022-02-27 RX ADMIN — METHADONE HYDROCHLORIDE 20 MILLIGRAM(S): 40 TABLET ORAL at 11:34

## 2022-02-27 RX ADMIN — METHADONE HYDROCHLORIDE 20 MILLIGRAM(S): 40 TABLET ORAL at 22:17

## 2022-02-27 RX ADMIN — SODIUM CHLORIDE 150 MILLILITER(S): 9 INJECTION INTRAMUSCULAR; INTRAVENOUS; SUBCUTANEOUS at 14:22

## 2022-02-27 NOTE — PATIENT PROFILE ADULT - FALL HARM RISK - UNIVERSAL INTERVENTIONS
Bed in lowest position, wheels locked, appropriate side rails in place/Call bell, personal items and telephone in reach/Instruct patient to call for assistance before getting out of bed or chair/Non-slip footwear when patient is out of bed/[place] to call system/Physically safe environment - no spills, clutter or unnecessary equipment/Purposeful Proactive Rounding/Room/bathroom lighting operational, light cord in reach

## 2022-02-27 NOTE — PROGRESS NOTE ADULT - SUBJECTIVE AND OBJECTIVE BOX
Patient is a 36y old  Male who presents with a chief complaint of Opioid withdrawal (2022 22:05)      SUBJECTIVE / OVERNIGHT EVENTS: no events over night     MEDICATIONS  (STANDING):  influenza   Vaccine 0.5 milliLiter(s) IntraMuscular once  nicotine  Polacrilex Gum 4 milliGRAM(s) Oral daily  pantoprazole    Tablet 40 milliGRAM(s) Oral before breakfast  sodium chloride 0.9%. 1000 milliLiter(s) (150 mL/Hr) IV Continuous <Continuous>  zolpidem 5 milliGRAM(s) Oral at bedtime    MEDICATIONS  (PRN):  ALBUTerol    90 MICROgram(s) HFA Inhaler 2 Puff(s) Inhalation every 6 hours PRN Shortness of Breath and/or Wheezing  loperamide 2 milliGRAM(s) Oral three times a day PRN Diarrhea      CAPILLARY BLOOD GLUCOSE        I&O's Summary    2022 07:01  -  2022 07:00  --------------------------------------------------------  IN: 1000 mL / OUT: 600 mL / NET: 400 mL      T(C): 36.4 (22 @ 20:31), Max: 36.4 (22 @ 13:14)  HR: 82 (22 @ 20:31) (82 - 87)  BP: 139/71 (22 @ 20:31) (116/69 - 139/71)  RR: 18 (22 @ 20:31) (16 - 18)  SpO2: 96% (22 @ 20:31) (95% - 96%)    PHYSICAL EXAM:  GENERAL: NAD, well-developed  HEAD:  Atraumatic, Normocephalic  EYES: EOMI, PERRLA, conjunctiva and sclera clear  NECK: Supple, No JVD  CHEST/LUNG: Clear to auscultation bilaterally; No wheeze  HEART: Regular rate and rhythm; No murmurs, rubs, or gallops  ABDOMEN: Soft, Nontender, Nondistended; Bowel sounds present  EXTREMITIES:  2+ Peripheral Pulses, No clubbing, cyanosis, or edema  PSYCH: AAOx3  NEUROLOGY: non-focal  SKIN: No rashes or lesions    LABS:                        12.3   9.32  )-----------( 224      ( 2022 06:37 )             35.4         140  |  111<H>  |  10  ----------------------------<  98  3.4<L>   |  21<L>  |  0.70    Ca    8.5      2022 06:37  Phos  3.5       Mg     2.1         TPro  7.1  /  Alb  3.6  /  TBili  0.6  /  DBili  x   /  AST  14  /  ALT  32  /  AlkPhos  101            Urinalysis Basic - ( 2022 23:38 )    Color: Yellow / Appearance: Clear / S.020 / pH: x  Gluc: x / Ketone: Negative  / Bili: Negative / Urobili: Negative   Blood: x / Protein: Negative / Nitrite: Negative   Leuk Esterase: Negative / RBC: 0-2 /HPF / WBC 0-2 /HPF   Sq Epi: x / Non Sq Epi: Occasional /HPF / Bacteria: Trace /HPF        RADIOLOGY & ADDITIONAL TESTS:    Imaging Personally Reviewed:    Consultant(s) Notes Reviewed:      Care Discussed with Consultants/Other Providers:

## 2022-02-27 NOTE — PROGRESS NOTE ADULT - PROBLEM SELECTOR PLAN 1
Presents with Diarrhea, Nausea, vomiting, diaphoresis, anxiety  Hemodynamically stable   COWS: 16, moderate withdrawal   S/p Methadone 10mg IM, ativan 4mg  s/p Octreotide for diarrhea   s/p 2L NS, cont IV fluids @150cc/hr  Start on loperamide for diarrhea   Give Methadone 20mg PO today   Psych consult

## 2022-02-27 NOTE — CHART NOTE - NSCHARTNOTEFT_GEN_A_CORE
36 M with pmhx polysubstance abuse p/w Acute Opiate Withdrawal     Patient seen and examined with House Staff    Reviewed labs and imaging     Patient with Acute Opiate Withdrawal   COWS score around 16, MICU consulted   s/p Methadone 10 mg IM, Ativan 2mg x 2 doses   IV fluids monitor CIWA score   SC Octreotide PPI
Paged by nursing staff, pt continues to be agitated and anxious.   - COWS - 16  - 2 mg of IV ativan.  - Will continue to evaluate.

## 2022-02-27 NOTE — PROGRESS NOTE ADULT - ASSESSMENT
37 yo M from home lives with brother , with history of polysubstance abuse, active smoker presented with withdrawal symptoms. Admitted for opioid withdrawal

## 2022-02-28 VITALS
DIASTOLIC BLOOD PRESSURE: 74 MMHG | OXYGEN SATURATION: 98 % | HEART RATE: 74 BPM | RESPIRATION RATE: 18 BRPM | TEMPERATURE: 98 F | SYSTOLIC BLOOD PRESSURE: 129 MMHG

## 2022-02-28 LAB
ALBUMIN SERPL ELPH-MCNC: 3 G/DL — LOW (ref 3.5–5)
ALP SERPL-CCNC: 90 U/L — SIGNIFICANT CHANGE UP (ref 40–120)
ALT FLD-CCNC: 31 U/L DA — SIGNIFICANT CHANGE UP (ref 10–60)
ANION GAP SERPL CALC-SCNC: 6 MMOL/L — SIGNIFICANT CHANGE UP (ref 5–17)
AST SERPL-CCNC: 10 U/L — SIGNIFICANT CHANGE UP (ref 10–40)
BILIRUB SERPL-MCNC: 0.5 MG/DL — SIGNIFICANT CHANGE UP (ref 0.2–1.2)
BUN SERPL-MCNC: 11 MG/DL — SIGNIFICANT CHANGE UP (ref 7–18)
CALCIUM SERPL-MCNC: 8.5 MG/DL — SIGNIFICANT CHANGE UP (ref 8.4–10.5)
CHLORIDE SERPL-SCNC: 113 MMOL/L — HIGH (ref 96–108)
CO2 SERPL-SCNC: 22 MMOL/L — SIGNIFICANT CHANGE UP (ref 22–31)
CREAT SERPL-MCNC: 0.77 MG/DL — SIGNIFICANT CHANGE UP (ref 0.5–1.3)
GLUCOSE SERPL-MCNC: 92 MG/DL — SIGNIFICANT CHANGE UP (ref 70–99)
HCT VFR BLD CALC: 34.9 % — LOW (ref 39–50)
HGB BLD-MCNC: 11.5 G/DL — LOW (ref 13–17)
MAGNESIUM SERPL-MCNC: 2.2 MG/DL — SIGNIFICANT CHANGE UP (ref 1.6–2.6)
MCHC RBC-ENTMCNC: 28.3 PG — SIGNIFICANT CHANGE UP (ref 27–34)
MCHC RBC-ENTMCNC: 33 GM/DL — SIGNIFICANT CHANGE UP (ref 32–36)
MCV RBC AUTO: 85.7 FL — SIGNIFICANT CHANGE UP (ref 80–100)
NRBC # BLD: 0 /100 WBCS — SIGNIFICANT CHANGE UP (ref 0–0)
PHOSPHATE SERPL-MCNC: 4.2 MG/DL — SIGNIFICANT CHANGE UP (ref 2.5–4.5)
PLATELET # BLD AUTO: 201 K/UL — SIGNIFICANT CHANGE UP (ref 150–400)
POTASSIUM SERPL-MCNC: 4.1 MMOL/L — SIGNIFICANT CHANGE UP (ref 3.5–5.3)
POTASSIUM SERPL-SCNC: 4.1 MMOL/L — SIGNIFICANT CHANGE UP (ref 3.5–5.3)
PROT SERPL-MCNC: 6.8 G/DL — SIGNIFICANT CHANGE UP (ref 6–8.3)
RBC # BLD: 4.07 M/UL — LOW (ref 4.2–5.8)
RBC # FLD: 12.4 % — SIGNIFICANT CHANGE UP (ref 10.3–14.5)
SODIUM SERPL-SCNC: 141 MMOL/L — SIGNIFICANT CHANGE UP (ref 135–145)
WBC # BLD: 8.7 K/UL — SIGNIFICANT CHANGE UP (ref 3.8–10.5)
WBC # FLD AUTO: 8.7 K/UL — SIGNIFICANT CHANGE UP (ref 3.8–10.5)

## 2022-02-28 PROCEDURE — 99285 EMERGENCY DEPT VISIT HI MDM: CPT

## 2022-02-28 PROCEDURE — 85027 COMPLETE CBC AUTOMATED: CPT

## 2022-02-28 PROCEDURE — 96375 TX/PRO/DX INJ NEW DRUG ADDON: CPT

## 2022-02-28 PROCEDURE — 87635 SARS-COV-2 COVID-19 AMP PRB: CPT

## 2022-02-28 PROCEDURE — 81001 URINALYSIS AUTO W/SCOPE: CPT

## 2022-02-28 PROCEDURE — 83735 ASSAY OF MAGNESIUM: CPT

## 2022-02-28 PROCEDURE — 84100 ASSAY OF PHOSPHORUS: CPT

## 2022-02-28 PROCEDURE — 80053 COMPREHEN METABOLIC PANEL: CPT

## 2022-02-28 PROCEDURE — 36415 COLL VENOUS BLD VENIPUNCTURE: CPT

## 2022-02-28 PROCEDURE — 96361 HYDRATE IV INFUSION ADD-ON: CPT

## 2022-02-28 PROCEDURE — 96376 TX/PRO/DX INJ SAME DRUG ADON: CPT

## 2022-02-28 PROCEDURE — 80307 DRUG TEST PRSMV CHEM ANLYZR: CPT

## 2022-02-28 PROCEDURE — 96374 THER/PROPH/DIAG INJ IV PUSH: CPT

## 2022-02-28 PROCEDURE — 85025 COMPLETE CBC W/AUTO DIFF WBC: CPT

## 2022-02-28 RX ORDER — NICOTINE POLACRILEX 2 MG
1 GUM BUCCAL
Qty: 0 | Refills: 0 | DISCHARGE
Start: 2022-02-28

## 2022-02-28 RX ORDER — NICOTINE POLACRILEX 2 MG
4 GUM BUCCAL DAILY
Refills: 0 | Status: DISCONTINUED | OUTPATIENT
Start: 2022-02-28 | End: 2022-02-28

## 2022-02-28 RX ORDER — NICOTINE POLACRILEX 2 MG
1 GUM BUCCAL DAILY
Refills: 0 | Status: DISCONTINUED | OUTPATIENT
Start: 2022-02-28 | End: 2022-02-28

## 2022-02-28 RX ORDER — NICOTINE POLACRILEX 2 MG
1 GUM BUCCAL
Qty: 240 | Refills: 0
Start: 2022-02-28 | End: 2022-03-09

## 2022-02-28 RX ORDER — LANOLIN ALCOHOL/MO/W.PET/CERES
3 CREAM (GRAM) TOPICAL ONCE
Refills: 0 | Status: COMPLETED | OUTPATIENT
Start: 2022-02-28 | End: 2022-02-28

## 2022-02-28 RX ORDER — METHADONE HYDROCHLORIDE 40 MG/1
10 TABLET ORAL ONCE
Refills: 0 | Status: DISCONTINUED | OUTPATIENT
Start: 2022-02-28 | End: 2022-02-28

## 2022-02-28 RX ADMIN — Medication 4 MILLIGRAM(S): at 11:11

## 2022-02-28 RX ADMIN — Medication 3 MILLIGRAM(S): at 00:48

## 2022-02-28 RX ADMIN — METHADONE HYDROCHLORIDE 10 MILLIGRAM(S): 40 TABLET ORAL at 13:41

## 2022-02-28 RX ADMIN — Medication 1 MILLIGRAM(S): at 11:10

## 2022-02-28 NOTE — SBIRT NOTE ADULT - NSSBIRTDRGACTIVEREFTXDET_GEN_A_CORE
Patient is interested in referral to outpatient substance abuse program.  Patient informed possible interest in suboxone or methadone program if it will help him to remain sober.  Print s/a resources provided to patient.  Patient agreed to referral to [facility].

## 2022-02-28 NOTE — DISCHARGE NOTE NURSING/CASE MANAGEMENT/SOCIAL WORK - PATIENT PORTAL LINK FT
You can access the FollowMyHealth Patient Portal offered by [facility] by registering at the following website: [email or web address]. By joining [employer]’s FollowMyHealth portal, you will also be able to view your health information using other applications (apps) compatible with our system.

## 2022-02-28 NOTE — DISCHARGE NOTE NURSING/CASE MANAGEMENT/SOCIAL WORK - NSDCPEFALRISK_GEN_ALL_CORE
For information on Fall & Injury Prevention, visit: [email or web address] OR  [email or web address] OR  https://www.cdc.gov/steadi/patient.html

## 2022-02-28 NOTE — DISCHARGE NOTE PROVIDER - HOSPITAL COURSE
35 yo M from home lives with brother , with history of polysubstance abuse, active smoker presented with withdrawal symptoms.  Pt endorsed started sniffing Fentanyl 2mg 5-6 times a day, takes Ketamine every once in a while, last Fentanyl use was 3pm on 2/25/2022, and last ketamine use around 2 weeks ago. Patient decided to quit substance abuse and his withdrawal sx , including , sweating , abdominal pain , diarrhea multiple times , vomiting twice, restless ness started since 1am last night, frequent episodes of diarrhea and abdominal pain prompt him to present to ED.      In ED patient found to have stable vitals  and normal ECG . Patient received Ativan 2mg X2  for anxiety , 2 L NS , Octreotide sq, abd Methadone 10mg IM. Pt. admitted to edicine for further withdrawal symptoms management, medicated with Ativan and low dose Methadone as needed with good effect.  Pt. would like to follow up with OP substance abuse rehab, followed by [facility]. 37 yo M from home lives with brother , with history of polysubstance abuse, active smoker presented with withdrawal symptoms.  Pt endorsed started sniffing Fentanyl 2mg 5-6 times a day, takes Ketamine every once in a while, last Fentanyl use was 3pm on 2/25/2022, and last ketamine use around 2 weeks ago. Patient decided to quit substance abuse and his withdrawal sx , including , sweating , abdominal pain , diarrhea multiple times , vomiting twice, restless ness started since 1am last night, frequent episodes of diarrhea and abdominal pain prompt him to present to ED.      In ED patient found to have stable vitals  and normal ECG . Patient received Ativan 2mg X2  for anxiety , 2 L NS , Octreotide sq, abd Methadone 10mg IM. Pt. admitted to edicine for further withdrawal symptoms management, medicated with Ativan and low dose Methadone as needed with good effect.  Pt. would like to follow up with OP substance abuse rehab, followed by [facility], referrals for OP clinic made.

## 2022-02-28 NOTE — DISCHARGE NOTE PROVIDER - NSDCMRMEDTOKEN_GEN_ALL_CORE_FT
nicotine: 1 gum orally every 2 to 3 hours  nicotine 2 mg oral transmucosal gum: 1 gum chewed every 1 to 2 hours

## 2022-02-28 NOTE — DISCHARGE NOTE NURSING/CASE MANAGEMENT/SOCIAL WORK - NSDCPEWEB_GEN_ALL_CORE
[facility] for [facility] website --- [email or web address] website --- [email or web address]

## 2022-02-28 NOTE — DISCHARGE NOTE NURSING/CASE MANAGEMENT/SOCIAL WORK - NSDCFUADDAPPT_GEN_ALL_CORE_FT
Bring or provide Medicaid ID # when you go to walk-in appointment on Friday 3/4 at 9:00am at [facility]

## 2022-02-28 NOTE — DISCHARGE NOTE PROVIDER - NSDCCPCAREPLAN_GEN_ALL_CORE_FT
PRINCIPAL DISCHARGE DIAGNOSIS  Diagnosis: Opiate withdrawal  Assessment and Plan of Treatment: You presented to ED with signs and symptoms of opiate withdrawals.  You were treated with medications and intravenous fluid hydration with good effect.  You endorsed to medical team that you would like to quit polysubstance abuse due to severity of withdrawal symptoms.    You were referred to  and recommended outpatient rehab.      SECONDARY DISCHARGE DIAGNOSES  Diagnosis: Asthma  Assessment and Plan of Treatment: No signs and symptoms of Asthma exacerbation noted.  Continue Asthma management as priotr to hospitalization.     PRINCIPAL DISCHARGE DIAGNOSIS  Diagnosis: Opiate withdrawal  Assessment and Plan of Treatment: You presented to ED with signs and symptoms of opiate withdrawals.  You were treated with medications and intravenous fluid hydration with good effect.  You endorsed to medical team that you would like to quit polysubstance abuse due to severity of withdrawal symptoms.    You were referred to  and recommended outpatient rehab at [place].  Referral was made by .  -Please follow up as discussed with       SECONDARY DISCHARGE DIAGNOSES  Diagnosis: Asthma  Assessment and Plan of Treatment: No signs and symptoms of Asthma exacerbation noted.  Continue Asthma management as priotr to hospitalization.    Diagnosis: Smoking  Assessment and Plan of Treatment: You were provided with Nicotine gum while admitted to ease smoking withdrawal symptoms.  You may continue gum nicotine replacement  Smoking cessation is recommended as smoking is associated with many health issues including but not limited to Emphysema, Lung and other cancers, heart disease, peripheral vascular disease etc.

## 2022-02-28 NOTE — DISCHARGE NOTE NURSING/CASE MANAGEMENT/SOCIAL WORK - NSDCPEEMAIL_GEN_ALL_CORE
[facility] for Tobacco Control email [email or web address]

## 2022-02-28 NOTE — DISCHARGE NOTE NURSING/CASE MANAGEMENT/SOCIAL WORK - NSSBIRTDRGACTIVEREFTXDET_GEN_A_CORE
Patient is interested in referral to outpatient substance abuse program.  Patient informed possible interest in suboxone or methadone program if it will help him to remain sober.  Print s/a resources provided to patient.  Patient agreed to referral to [facility].

## 2022-08-31 ENCOUNTER — EMERGENCY (EMERGENCY)
Facility: HOSPITAL | Age: 37
LOS: 1 days | Discharge: HOME | End: 2022-08-31
Attending: STUDENT IN AN ORGANIZED HEALTH CARE EDUCATION/TRAINING PROGRAM
Payer: MEDICAID

## 2022-08-31 VITALS
HEART RATE: 96 BPM | SYSTOLIC BLOOD PRESSURE: 119 MMHG | OXYGEN SATURATION: 100 % | RESPIRATION RATE: 19 BRPM | WEIGHT: 268.08 LBS | DIASTOLIC BLOOD PRESSURE: 76 MMHG | HEIGHT: 72 IN | TEMPERATURE: 99 F

## 2022-08-31 PROCEDURE — 73090 X-RAY EXAM OF FOREARM: CPT

## 2022-08-31 PROCEDURE — 99284 EMERGENCY DEPT VISIT MOD MDM: CPT | Mod: 25

## 2022-08-31 PROCEDURE — 73080 X-RAY EXAM OF ELBOW: CPT

## 2022-08-31 PROCEDURE — 73110 X-RAY EXAM OF WRIST: CPT | Mod: 26,LT

## 2022-08-31 PROCEDURE — 73110 X-RAY EXAM OF WRIST: CPT

## 2022-08-31 PROCEDURE — 73080 X-RAY EXAM OF ELBOW: CPT | Mod: 26,LT

## 2022-08-31 PROCEDURE — 73090 X-RAY EXAM OF FOREARM: CPT | Mod: 26,LT

## 2022-08-31 PROCEDURE — 99284 EMERGENCY DEPT VISIT MOD MDM: CPT

## 2022-08-31 RX ORDER — IBUPROFEN 200 MG
600 TABLET ORAL ONCE
Refills: 0 | Status: COMPLETED | OUTPATIENT
Start: 2022-08-31 | End: 2022-08-31

## 2022-08-31 RX ADMIN — Medication 600 MILLIGRAM(S): at 18:04

## 2022-08-31 RX ADMIN — Medication 600 MILLIGRAM(S): at 16:53

## 2022-08-31 NOTE — ED ADULT NURSE NOTE - OBJECTIVE STATEMENT
Startes he slipped  and fell on about 2 steps solid cement while at work yesterday ,c/o left forearm pain .

## 2022-08-31 NOTE — ED PROVIDER NOTE - OBJECTIVE STATEMENT
37 year old male with no pertinent PMH presents with left arm pain since yesterday. Pt states he accidentally dropped heavy object on left forearm yesterday with immediate pain. Denies any head injury or LOC. Pt repots worsening pain today prompting ED visit. Denies any fevers, numbness, weakness, or rash. Denies any additional complaints.

## 2022-08-31 NOTE — ED ADULT NURSE NOTE - CINV DISCH TEACH PARTICIP
Problem: At Risk for Falls  Goal: # Patient does not fall  Outcome: Outcome Not Met, Continue to Monitor  Goal: # Takes action to control fall-related risks  Outcome: Outcome Not Met, Continue to Monitor     Problem: At Risk for Injury Due to Fall  Goal: # Takes action to control condition specific risks  Outcome: Outcome Not Met, Continue to Monitor      Patient

## 2022-08-31 NOTE — ED PROVIDER NOTE - CLINICAL SUMMARY MEDICAL DECISION MAKING FREE TEXT BOX
Alejandro-DO: 37 year old male with no pertinent PMH presents with left arm pain since yesterday. Pt states he accidentally dropped heavy object on left forearm yesterday with immediate pain. Denies any head injury or LOC. Pt repots worsening pain today prompting ED visit. Denies any fevers, numbness, weakness, or rash. Extremity tender over proximal forearm

## 2022-08-31 NOTE — ED ADULT NURSE NOTE - NSFALLRSKASSESSTYPE_ED_ALL_ED
49 yo M PMHx GERD, HTN, COPD, morbid obesity presents for evaluation of SOB, chest tightness/pain    Dyspnea with chest tightness/pain  - likely due to copd exacerbation but need to r/o cardiac cause, especially in setting of exertion with LE edema  - repeat CE  - obtain records from Dr. Wen office, may need to repeat 2d echo  - c/w solumedrol IV  - no need for abx at present time as no worsening cough or change in purulence  - nebs prn  - Symbicort for Advair singulair    HTN  - c/w losartan    Chronic pain  - pt states he only needs oxycodone/morphine so can hold dialudid/methadone    GERD  - protonix for nexium    DVT px  Full Code  Independent from home
Initial (On Arrival)

## 2022-08-31 NOTE — ED PROVIDER NOTE - ADDITIONAL NOTES AND INSTRUCTIONS:
Patient may return to work with limitation to avoid heavy lifting for the next 3 days then can return to normal work without limitations (9/3/22).

## 2022-08-31 NOTE — ED PROVIDER NOTE - NSFOLLOWUPINSTRUCTIONS_ED_ALL_ED_FT
Rest and stay well hydrated.    Take over the counter acetaminophen (Tylenol) 650-1000 mg every 4-6 hours as needed for pain. Do not take more than 3000 mg in a 24 hour period. Be aware many over the counter and prescription medications also contain acetaminophen (Tylenol).     Take over the counter ibuprofen 400-600 mg every 6 hours with food as needed for pain.  Do not take these medications if you do not have pain or if you have any history of bleeding disorder or, kidney disease. Do not use ibuprofen if you are on blood thinners (anti-coagulation).    Use sling as discussed; range shoulder multiple times daily to avoid frozen shoulder as discussed.     Follow up with orthopedics as needed.     SEEK IMMEDIATE MEDICAL CARE IF YOU HAVE ANY OF THE FOLLOWING SYMPTOMS: worsening pain, inability to move that body part, numbness or weakness.

## 2022-08-31 NOTE — ED PROVIDER NOTE - NSFOLLOWUPCLINICS_GEN_ALL_ED_FT
[facility]  Orthopedics  [address] [place] [address]  Phone: [phone]  Fax: [phone]

## 2022-08-31 NOTE — ED PROVIDER NOTE - PHYSICAL EXAMINATION
CONSTITUTIONAL: non-toxic, well appearing  SKIN: no rash, no petechiae.  EYES: pink conjunctiva, anicteric  NECK: Supple; FROM  CARD: extremities warm, dry, well perfused  RESP: no respiratory distress  ABD: non-distended  EXT: No edema. Left forearm tender over proximal radius and ulna, no point tenderness, pain with supination, FROM of elbow, no bony tenderness of elbow or wrist, no snuffbox tenderness, FROM of hand with 5/5 strength throughout radial, ulnar, and median nerve distributions, compartments soft  NEURO: Alert, oriented.  PSYCH: Cooperative, appropriate.

## 2022-08-31 NOTE — ED PROVIDER NOTE - NSICDXNOPASTSURGICALHX_GEN_ALL_ED
Statement Selected Ndc (200 Mg Prefilled Syringe): [address]-81 Ndc (200 Mg Prefilled Syringe): [address]-87 <-- Click to add NO significant Past Surgical History

## 2022-08-31 NOTE — ED PROVIDER NOTE - NS ED ROS FT
Review of Systems    Constitutional: (-) fever, (-) chills, (-) fatigue  HEENT: (-) sore throat, (-) hearing loss, (-) nasal congestion  Cardiovascular: (-) chest pain, (-) syncope  Respiratory: (-) cough, (-) shortness of breath  Gastrointestinal: (-) vomiting, (-) diarrhea, (-) abdominal pain  Musculoskeletal: (-) neck pain, (-) back pain, (+) arm pain  Integumentary: (-) rash, (-) edema, (-) wound  Neurological: (-) headache, (-) altered mental status    Except as documented in the HPI, all other systems are negative.

## 2022-08-31 NOTE — ED PROVIDER NOTE - PROGRESS NOTE DETAILS
Lucks-DO: pain improved. XR neg per my wet read, pending radiology read, pt aware. Sling applied, pt instructed on proper use. Will DC with PRN ortho follow up with return precautions. Pt understood and agreeable with plan.

## 2022-08-31 NOTE — ED ADULT NURSE NOTE - CAS EDN DISCHARGE ASSESSMENT
Lab orders extended as per request.  
Mom calling to set up labs ordered by Dr Sheppard. Please extend lab orders.   
Alert and oriented to person, place and time

## 2022-09-01 PROBLEM — J45.909 UNSPECIFIED ASTHMA, UNCOMPLICATED: Chronic | Status: ACTIVE | Noted: 2022-02-26

## 2022-09-01 PROBLEM — F19.10 OTHER PSYCHOACTIVE SUBSTANCE ABUSE, UNCOMPLICATED: Chronic | Status: ACTIVE | Noted: 2022-02-26

## 2022-09-01 PROBLEM — R01.1 CARDIAC MURMUR, UNSPECIFIED: Chronic | Status: ACTIVE | Noted: 2022-02-26

## 2025-04-24 ENCOUNTER — EMERGENCY (EMERGENCY)
Facility: HOSPITAL | Age: 40
LOS: 1 days | End: 2025-04-24
Attending: STUDENT IN AN ORGANIZED HEALTH CARE EDUCATION/TRAINING PROGRAM
Payer: MEDICAID

## 2025-04-24 VITALS
OXYGEN SATURATION: 97 % | DIASTOLIC BLOOD PRESSURE: 87 MMHG | HEART RATE: 82 BPM | SYSTOLIC BLOOD PRESSURE: 135 MMHG | RESPIRATION RATE: 17 BRPM | TEMPERATURE: 98 F

## 2025-04-24 VITALS
SYSTOLIC BLOOD PRESSURE: 149 MMHG | DIASTOLIC BLOOD PRESSURE: 93 MMHG | HEART RATE: 83 BPM | RESPIRATION RATE: 18 BRPM | HEIGHT: 71 IN | OXYGEN SATURATION: 100 % | WEIGHT: 278.44 LBS | TEMPERATURE: 98 F

## 2025-04-24 LAB
ALBUMIN SERPL ELPH-MCNC: 3.4 G/DL — LOW (ref 3.5–5)
ALP SERPL-CCNC: 140 U/L — HIGH (ref 40–120)
ALT FLD-CCNC: 86 U/L DA — HIGH (ref 10–60)
ANION GAP SERPL CALC-SCNC: 7 MMOL/L — SIGNIFICANT CHANGE UP (ref 5–17)
AST SERPL-CCNC: 50 U/L — HIGH (ref 10–40)
BASOPHILS # BLD AUTO: 0.01 K/UL — SIGNIFICANT CHANGE UP (ref 0–0.2)
BASOPHILS NFR BLD AUTO: 0.1 % — SIGNIFICANT CHANGE UP (ref 0–2)
BILIRUB SERPL-MCNC: 0.6 MG/DL — SIGNIFICANT CHANGE UP (ref 0.2–1.2)
BUN SERPL-MCNC: 7 MG/DL — SIGNIFICANT CHANGE UP (ref 7–18)
CALCIUM SERPL-MCNC: 8.7 MG/DL — SIGNIFICANT CHANGE UP (ref 8.4–10.5)
CHLORIDE SERPL-SCNC: 106 MMOL/L — SIGNIFICANT CHANGE UP (ref 96–108)
CO2 SERPL-SCNC: 25 MMOL/L — SIGNIFICANT CHANGE UP (ref 22–31)
CREAT SERPL-MCNC: 0.85 MG/DL — SIGNIFICANT CHANGE UP (ref 0.5–1.3)
EGFR: 112 ML/MIN/1.73M2 — SIGNIFICANT CHANGE UP
EGFR: 112 ML/MIN/1.73M2 — SIGNIFICANT CHANGE UP
EOSINOPHIL # BLD AUTO: 0.11 K/UL — SIGNIFICANT CHANGE UP (ref 0–0.5)
EOSINOPHIL NFR BLD AUTO: 1.4 % — SIGNIFICANT CHANGE UP (ref 0–6)
GLUCOSE SERPL-MCNC: 99 MG/DL — SIGNIFICANT CHANGE UP (ref 70–99)
HCT VFR BLD CALC: 35.5 % — LOW (ref 39–50)
HGB BLD-MCNC: 12.1 G/DL — LOW (ref 13–17)
IMM GRANULOCYTES NFR BLD AUTO: 0.4 % — SIGNIFICANT CHANGE UP (ref 0–0.9)
LACTATE SERPL-SCNC: 0.7 MMOL/L — SIGNIFICANT CHANGE UP (ref 0.7–2)
LIDOCAIN IGE QN: 22 U/L — SIGNIFICANT CHANGE UP (ref 13–75)
LYMPHOCYTES # BLD AUTO: 1.76 K/UL — SIGNIFICANT CHANGE UP (ref 1–3.3)
LYMPHOCYTES # BLD AUTO: 23 % — SIGNIFICANT CHANGE UP (ref 13–44)
MCHC RBC-ENTMCNC: 29.8 PG — SIGNIFICANT CHANGE UP (ref 27–34)
MCHC RBC-ENTMCNC: 34.1 G/DL — SIGNIFICANT CHANGE UP (ref 32–36)
MCV RBC AUTO: 87.4 FL — SIGNIFICANT CHANGE UP (ref 80–100)
MONOCYTES # BLD AUTO: 0.65 K/UL — SIGNIFICANT CHANGE UP (ref 0–0.9)
MONOCYTES NFR BLD AUTO: 8.5 % — SIGNIFICANT CHANGE UP (ref 2–14)
NEUTROPHILS # BLD AUTO: 5.09 K/UL — SIGNIFICANT CHANGE UP (ref 1.8–7.4)
NEUTROPHILS NFR BLD AUTO: 66.6 % — SIGNIFICANT CHANGE UP (ref 43–77)
NRBC BLD AUTO-RTO: 0 /100 WBCS — SIGNIFICANT CHANGE UP (ref 0–0)
PLATELET # BLD AUTO: 200 K/UL — SIGNIFICANT CHANGE UP (ref 150–400)
POTASSIUM SERPL-MCNC: 3.3 MMOL/L — LOW (ref 3.5–5.3)
POTASSIUM SERPL-SCNC: 3.3 MMOL/L — LOW (ref 3.5–5.3)
PROT SERPL-MCNC: 7.4 G/DL — SIGNIFICANT CHANGE UP (ref 6–8.3)
RBC # BLD: 4.06 M/UL — LOW (ref 4.2–5.8)
RBC # FLD: 12.6 % — SIGNIFICANT CHANGE UP (ref 10.3–14.5)
SODIUM SERPL-SCNC: 138 MMOL/L — SIGNIFICANT CHANGE UP (ref 135–145)
WBC # BLD: 7.65 K/UL — SIGNIFICANT CHANGE UP (ref 3.8–10.5)
WBC # FLD AUTO: 7.65 K/UL — SIGNIFICANT CHANGE UP (ref 3.8–10.5)

## 2025-04-24 PROCEDURE — 83605 ASSAY OF LACTIC ACID: CPT

## 2025-04-24 PROCEDURE — 80053 COMPREHEN METABOLIC PANEL: CPT

## 2025-04-24 PROCEDURE — 85025 COMPLETE CBC W/AUTO DIFF WBC: CPT

## 2025-04-24 PROCEDURE — 96374 THER/PROPH/DIAG INJ IV PUSH: CPT

## 2025-04-24 PROCEDURE — 36415 COLL VENOUS BLD VENIPUNCTURE: CPT

## 2025-04-24 PROCEDURE — 83690 ASSAY OF LIPASE: CPT

## 2025-04-24 PROCEDURE — 99284 EMERGENCY DEPT VISIT MOD MDM: CPT

## 2025-04-24 PROCEDURE — 99284 EMERGENCY DEPT VISIT MOD MDM: CPT | Mod: 25

## 2025-04-24 RX ORDER — NAPROXEN SODIUM 275 MG
1 TABLET ORAL
Qty: 10 | Refills: 0
Start: 2025-04-24 | End: 2025-04-28

## 2025-04-24 RX ORDER — DOCUSATE SODIUM 100 MG
1 CAPSULE ORAL
Qty: 7 | Refills: 0
Start: 2025-04-24 | End: 2025-04-30

## 2025-04-24 RX ORDER — KETOROLAC TROMETHAMINE 30 MG/ML
15 INJECTION, SOLUTION INTRAMUSCULAR; INTRAVENOUS ONCE
Refills: 0 | Status: DISCONTINUED | OUTPATIENT
Start: 2025-04-24 | End: 2025-04-24

## 2025-04-24 RX ADMIN — KETOROLAC TROMETHAMINE 15 MILLIGRAM(S): 30 INJECTION, SOLUTION INTRAMUSCULAR; INTRAVENOUS at 03:09
